# Patient Record
Sex: FEMALE | Race: WHITE | NOT HISPANIC OR LATINO | Employment: FULL TIME | URBAN - METROPOLITAN AREA
[De-identification: names, ages, dates, MRNs, and addresses within clinical notes are randomized per-mention and may not be internally consistent; named-entity substitution may affect disease eponyms.]

---

## 2017-01-10 ENCOUNTER — ALLSCRIPTS OFFICE VISIT (OUTPATIENT)
Dept: OTHER | Facility: OTHER | Age: 52
End: 2017-01-10

## 2017-05-23 ENCOUNTER — ALLSCRIPTS OFFICE VISIT (OUTPATIENT)
Dept: OTHER | Facility: OTHER | Age: 52
End: 2017-05-23

## 2017-06-29 RX ORDER — ACETAMINOPHEN, ASPIRIN AND CAFFEINE 250; 250; 65 MG/1; MG/1; MG/1
1 TABLET, FILM COATED ORAL EVERY 6 HOURS PRN
COMMUNITY

## 2017-06-29 RX ORDER — LORAZEPAM 1 MG/1
1 TABLET ORAL EVERY 8 HOURS PRN
COMMUNITY

## 2017-06-29 RX ORDER — ESCITALOPRAM OXALATE 5 MG/1
5 TABLET ORAL EVERY MORNING
COMMUNITY

## 2017-06-29 RX ORDER — LEVOTHYROXINE SODIUM 0.07 MG/1
75 TABLET ORAL EVERY MORNING
COMMUNITY

## 2017-06-29 RX ORDER — LISINOPRIL 10 MG/1
10 TABLET ORAL
COMMUNITY

## 2017-06-29 RX ORDER — BUPROPION HYDROCHLORIDE 150 MG/1
150 TABLET ORAL
COMMUNITY

## 2017-06-29 RX ORDER — DIPHENOXYLATE HYDROCHLORIDE AND ATROPINE SULFATE 2.5; .025 MG/1; MG/1
1 TABLET ORAL DAILY
COMMUNITY

## 2017-07-05 ENCOUNTER — ANESTHESIA EVENT (OUTPATIENT)
Dept: PERIOP | Facility: AMBULARY SURGERY CENTER | Age: 52
End: 2017-07-05
Payer: COMMERCIAL

## 2017-07-06 ENCOUNTER — HOSPITAL ENCOUNTER (OUTPATIENT)
Facility: AMBULARY SURGERY CENTER | Age: 52
Setting detail: OUTPATIENT SURGERY
Discharge: HOME | End: 2017-07-06
Attending: ORTHOPAEDIC SURGERY | Admitting: ORTHOPAEDIC SURGERY
Payer: COMMERCIAL

## 2017-07-06 ENCOUNTER — ANESTHESIA (OUTPATIENT)
Dept: PERIOP | Facility: AMBULARY SURGERY CENTER | Age: 52
End: 2017-07-06
Payer: COMMERCIAL

## 2017-07-06 VITALS
SYSTOLIC BLOOD PRESSURE: 103 MMHG | OXYGEN SATURATION: 94 % | WEIGHT: 194 LBS | HEIGHT: 67 IN | HEART RATE: 80 BPM | BODY MASS INDEX: 30.45 KG/M2 | DIASTOLIC BLOOD PRESSURE: 59 MMHG | RESPIRATION RATE: 16 BRPM | TEMPERATURE: 97.5 F

## 2017-07-06 LAB — EXT PREGNANCY TEST URINE: NORMAL

## 2017-07-06 PROCEDURE — 81025 URINE PREGNANCY TEST: CPT | Performed by: ORTHOPAEDIC SURGERY

## 2017-07-06 RX ORDER — MIDAZOLAM HYDROCHLORIDE 1 MG/ML
INJECTION INTRAMUSCULAR; INTRAVENOUS AS NEEDED
Status: DISCONTINUED | OUTPATIENT
Start: 2017-07-06 | End: 2017-07-06 | Stop reason: SURG

## 2017-07-06 RX ORDER — SODIUM CHLORIDE, SODIUM LACTATE, POTASSIUM CHLORIDE, CALCIUM CHLORIDE 600; 310; 30; 20 MG/100ML; MG/100ML; MG/100ML; MG/100ML
100 INJECTION, SOLUTION INTRAVENOUS CONTINUOUS
Status: DISCONTINUED | OUTPATIENT
Start: 2017-07-06 | End: 2017-07-06 | Stop reason: HOSPADM

## 2017-07-06 RX ORDER — FENTANYL CITRATE 50 UG/ML
INJECTION, SOLUTION INTRAMUSCULAR; INTRAVENOUS AS NEEDED
Status: DISCONTINUED | OUTPATIENT
Start: 2017-07-06 | End: 2017-07-06 | Stop reason: SURG

## 2017-07-06 RX ORDER — PROPOFOL 10 MG/ML
INJECTION, EMULSION INTRAVENOUS AS NEEDED
Status: DISCONTINUED | OUTPATIENT
Start: 2017-07-06 | End: 2017-07-06 | Stop reason: SURG

## 2017-07-06 RX ADMIN — SODIUM CHLORIDE, SODIUM LACTATE, POTASSIUM CHLORIDE, AND CALCIUM CHLORIDE 100 ML/HR: .6; .31; .03; .02 INJECTION, SOLUTION INTRAVENOUS at 06:41

## 2017-07-06 RX ADMIN — FENTANYL CITRATE 50 MCG: 50 INJECTION, SOLUTION INTRAMUSCULAR; INTRAVENOUS at 07:21

## 2017-07-06 RX ADMIN — FENTANYL CITRATE 50 MCG: 50 INJECTION, SOLUTION INTRAMUSCULAR; INTRAVENOUS at 07:28

## 2017-07-06 RX ADMIN — PROPOFOL 150 MG: 10 INJECTION, EMULSION INTRAVENOUS at 07:22

## 2017-07-06 RX ADMIN — MIDAZOLAM HYDROCHLORIDE 2 MG: 1 INJECTION, SOLUTION INTRAMUSCULAR; INTRAVENOUS at 07:19

## 2017-07-06 RX ADMIN — CEFAZOLIN SODIUM 2000 MG: 2 SOLUTION INTRAVENOUS at 07:23

## 2017-07-06 RX ADMIN — PROPOFOL 50 MG: 10 INJECTION, EMULSION INTRAVENOUS at 07:27

## 2017-07-18 ENCOUNTER — ALLSCRIPTS OFFICE VISIT (OUTPATIENT)
Dept: OTHER | Facility: OTHER | Age: 52
End: 2017-07-18

## 2021-01-19 ENCOUNTER — OFFICE VISIT (OUTPATIENT)
Dept: OBGYN CLINIC | Facility: CLINIC | Age: 56
End: 2021-01-19
Payer: COMMERCIAL

## 2021-01-19 ENCOUNTER — TELEPHONE (OUTPATIENT)
Dept: OBGYN CLINIC | Facility: CLINIC | Age: 56
End: 2021-01-19

## 2021-01-19 VITALS
HEIGHT: 67 IN | WEIGHT: 207.2 LBS | HEART RATE: 88 BPM | DIASTOLIC BLOOD PRESSURE: 78 MMHG | SYSTOLIC BLOOD PRESSURE: 130 MMHG | BODY MASS INDEX: 32.52 KG/M2

## 2021-01-19 DIAGNOSIS — M77.8 FOREARM TENDONITIS: Primary | ICD-10-CM

## 2021-01-19 PROCEDURE — 99243 OFF/OP CNSLTJ NEW/EST LOW 30: CPT | Performed by: ORTHOPAEDIC SURGERY

## 2021-01-19 NOTE — PROGRESS NOTES
Assessment/Plan:  1  Forearm tendonitis, left         Scribe Attestation    I,:  Yaz Simpson am acting as a scribe while in the presence of the attending physician :       I,:  Humza Ross MD personally performed the services described in this documentation    as scribed in my presence :         After obtaining a thorough history, clinical examination and review of imaging, I do believe Vik Sam is demonstrating signs and symptoms consistent with ECRL and ECRB tendinitis  She does demonstrate tenderness along the muscle belly of the common extensors  Additionally, she does have a negative radial tunnel sign on exam   I do believe we can begin treating her non operatively in the form of a cock-up wrist brace  She may obtain this brace at her local drug store  If she is unable to purchase this brace, she may follow up in the office and 1 of my medical assistants may fit her for this brace  Additionally, I did provide her with instructions on obtaining a flex bar and associated exercises  I do believe she will see improvement in her symptoms with the brace and these exercises  If she does not see progression in 4 weeks she may follow up, otherwise she may follow up on an as-needed basis  Subjective:   Jie Pollard is a 54 y o  female who presents to the office today for initial evaluation of left forearm pain  She states she began experiencing pain roughly 1 month ago with no inciting event  She states activities such as gripping or pulling her pants up exacerbates her symptoms  She localizes majority of her discomfort along for the lateral aspect of her forearm  She describes her pain as activity related, achy, sore, occasionally sharp and moderate in intensity  She denies any conservative treatment in the form of pain medication, stretches or bracing  She does appreciate mild numbness into her hand when she is laying on her left arm    However, she denies any distal paresthesia today       Review of Systems   Constitutional: Positive for activity change  Negative for chills, fever and unexpected weight change  HENT: Negative for hearing loss, nosebleeds and sore throat  Eyes: Negative for pain, redness and visual disturbance  Respiratory: Negative for cough, shortness of breath and wheezing  Cardiovascular: Negative for chest pain, palpitations and leg swelling  Gastrointestinal: Negative for abdominal pain, nausea and vomiting  Endocrine: Negative for polydipsia and polyuria  Genitourinary: Negative for dysuria and hematuria  Musculoskeletal:        See HPI   Skin: Negative for rash and wound  Neurological: Negative for dizziness, numbness and headaches  Psychiatric/Behavioral: Negative for decreased concentration and suicidal ideas  The patient is not nervous/anxious            Past Medical History:   Diagnosis Date    Anxiety     Depression     Disease of thyroid gland     hypo    Hypertension     Panic disorder     Wears glasses     for driving and close work       Past Surgical History:   Procedure Laterality Date    CERVIX SURGERY      removal of HPV    COLONOSCOPY      ESOPHAGOGASTRODUODENOSCOPY      [facility] REVISE MEDIAN N/CARPAL TUNNEL SURG Right 7/6/2017    Procedure: RELEASE CARPAL TUNNEL;  Surgeon: Darling Marrero MD;  [facility] MAIN OR;  Service: Orthopedics    WISDOM TOOTH EXTRACTION      x4       Family History   Problem Relation Age of Onset    Osteoarthritis Mother     Hypertension Father     Diabetes Brother     Arthritis Brother         rheumatoid       Social History     Occupational History    Not on file   Tobacco Use    Smoking status: Never Smoker    Smokeless tobacco: Never Used   Substance and Sexual Activity    Alcohol use: Not Currently    Drug use: No     Comment: stopped occ marijuana 11/16    Sexual activity: Not Currently         Current Outpatient Medications:     aspirin-acetaminophen-caffeine Ayaz Madison MIGRAINE) 250-250-65 MG per tablet, Take 1 tablet by mouth every 6 (six) hours as needed for headaches, Disp: , Rfl:     buPROPion (WELLBUTRIN XL) 150 mg 24 hr tablet, Take 150 mg by mouth daily at bedtime, Disp: , Rfl:     escitalopram (LEXAPRO) 5 mg tablet, Take 5 mg by mouth every morning, Disp: , Rfl:     levothyroxine 75 mcg tablet, Take 75 mcg by mouth every morning, Disp: , Rfl:     lisinopril (ZESTRIL) 10 mg tablet, Take 10 mg by mouth daily at bedtime, Disp: , Rfl:     LORazepam (ATIVAN) 1 mg tablet, Take 1 mg by mouth every 8 (eight) hours as needed for anxiety, Disp: , Rfl:     multivitamin (THERAGRAN) TABS, Take 1 tablet by mouth daily, Disp: , Rfl:     Allergies   Allergen Reactions    Zithromax [Azithromycin] Hives    Bacitracin Rash    Latex Rash     Diaphragm,bandages       Objective:  Vitals:    01/19/21 1304   BP: 130/78   Pulse: 88       Left Elbow Exam     Tenderness   Left elbow tenderness location: Along the muscle belly of the common extensors  Range of Motion   Extension: 0   Flexion: 120     Muscle Strength   Pronation:  5/5   Supination:  5/5     Tests   Tinel's sign (cubital tunnel): negative    Other   Erythema: absent  Sensation: normal  Pulse: present    Comments:    Radial tunnel test (-)            Physical Exam  Vitals signs reviewed  Constitutional:       Appearance: Normal appearance  She is well-developed  HENT:      Head: Normocephalic and atraumatic  Eyes:      General:         Right eye: No discharge  Left eye: No discharge  Conjunctiva/sclera: Conjunctivae normal       Pupils: Pupils are equal, round, and reactive to light  Neck:      Musculoskeletal: Normal range of motion and neck supple  Cardiovascular:      Rate and Rhythm: Normal rate  Pulmonary:      Effort: Pulmonary effort is normal  No respiratory distress  Musculoskeletal:      Comments: As noted in HPI  Skin:     General: Skin is warm and dry     Neurological:      General: No focal deficit present  Mental Status: She is alert and oriented to person, place, and time     Psychiatric:         Mood and Affect: Mood normal          Behavior: Behavior normal

## 2021-01-19 NOTE — TELEPHONE ENCOUNTER
Patient may come in to the [facility] office on Monday if she is unable to find a cock-up wrist brace at her local drug store  She will call Monday morning if she will be coming in  Appointment is not needed

## 2021-01-25 DIAGNOSIS — M77.8 FOREARM TENDONITIS: Primary | ICD-10-CM

## 2022-06-24 ENCOUNTER — TELEPHONE (OUTPATIENT)
Dept: OBGYN CLINIC | Facility: HOSPITAL | Age: 57
End: 2022-06-24

## 2022-07-19 ENCOUNTER — APPOINTMENT (OUTPATIENT)
Dept: RADIOLOGY | Facility: CLINIC | Age: 57
End: 2022-07-19
Payer: COMMERCIAL

## 2022-07-19 ENCOUNTER — OFFICE VISIT (OUTPATIENT)
Dept: OBGYN CLINIC | Facility: CLINIC | Age: 57
End: 2022-07-19
Payer: COMMERCIAL

## 2022-07-19 DIAGNOSIS — S93.409A SPRAIN OF ANKLE, INITIAL ENCOUNTER: ICD-10-CM

## 2022-07-19 DIAGNOSIS — M25.571 PAIN, JOINT, ANKLE AND FOOT, RIGHT: ICD-10-CM

## 2022-07-19 DIAGNOSIS — M76.71 PERONEAL TENDONITIS, RIGHT: ICD-10-CM

## 2022-07-19 DIAGNOSIS — M25.571 PAIN, JOINT, ANKLE AND FOOT, RIGHT: Primary | ICD-10-CM

## 2022-07-19 PROCEDURE — 99214 OFFICE O/P EST MOD 30 MIN: CPT | Performed by: ORTHOPAEDIC SURGERY

## 2022-07-19 PROCEDURE — 73610 X-RAY EXAM OF ANKLE: CPT

## 2022-07-19 NOTE — PROGRESS NOTES
Assessment/Plan:  1  Pain, joint, ankle and foot, right  XR ankle 3+ vw right   2  Peroneal tendonitis, right  Ambulatory Referral to Physical Therapy   3  Sprain of ankle, initial encounter  Ambulatory Referral to Physical Therapy     The patient does appear to have peroneal tendonitis and possibly a mild ankle sprain  Her numbness is likely due to some swelling around the peroneal nerve  I do feel she would benefit from physical therapy for this, and I did prescribe this today  She may continue to ambulate as tolerated  I do not suspect a tendon or ligament tear at this time, however if the patient fails to make progress I will likely order an MRI to further evaluate  She will FU in 6 weeks for repeat evaluation  Subjective:   Brent Kerr is a 62 y o  female who presents today for evaluation of her right ankle  She notes she started with lateral ankle pain back in May after getting into and out of a car repeatedly  She denies any specific injury prior to the onset of her symptoms  She did see podiatry for this and did bracing for 4 weeks, then had a steroid injection, then was placed in a boot most recently, however she stopped wearing this  She notes pain about the lateral ankle, which is worse with activity and walking on uneven ground  Rest helps  She notes intermittent swelling  She complains of some paresthesias about the dorsum of the foot  Review of Systems   Constitutional: Negative  Negative for chills and fever  HENT: Negative  Negative for ear pain and sore throat  Eyes: Negative  Negative for pain and redness  Respiratory: Negative  Negative for shortness of breath and wheezing  Cardiovascular: Negative for chest pain and palpitations  Gastrointestinal: Negative  Negative for abdominal pain and blood in stool  Endocrine: Negative  Negative for polydipsia and polyuria  Genitourinary: Negative  Negative for difficulty urinating and dysuria     Musculoskeletal:        As noted in HPI   Skin: Negative  Negative for pallor and rash  Neurological: Positive for numbness  Negative for dizziness  Hematological: Negative  Negative for adenopathy  Does not bruise/bleed easily  Psychiatric/Behavioral: Negative  Negative for confusion and suicidal ideas           Past Medical History:   Diagnosis Date    Anxiety     Depression     Disease of thyroid gland     hypo    Hypertension     Panic disorder     Wears glasses     for driving and close work       Past Surgical History:   Procedure Laterality Date    CERVIX SURGERY      removal of HPV    COLONOSCOPY      ESOPHAGOGASTRODUODENOSCOPY      [facility] RE[facility] MEDIAN N/CARPAL TUNNEL SURG Right 7/6/2017    Procedure: RELEASE CARPAL TUNNEL;  Surgeon: Katty Lanza MD;  Location: [place] [address] MAIN OR;  Service: Orthopedics    WISDOM TOOTH EXTRACTION      x4       Family History   Problem Relation Age of Onset    Osteoarthritis Mother     Hypertension Father     Diabetes Brother     Arthritis Brother         rheumatoid       Social History     Occupational History    Not on file   Tobacco Use    Smoking status: Never Smoker    Smokeless tobacco: Never Used   Vaping Use    Vaping Use: Never used   Substance and Sexual Activity    Alcohol use: Not Currently    Drug use: No     Comment: stopped occ marijuana 11/16    Sexual activity: Not Currently         Current Outpatient Medications:     aspirin-acetaminophen-caffeine (EXCEDRIN MIGRAINE) 250-250-65 MG per tablet, Take 1 tablet by mouth every 6 (six) hours as needed for headaches, Disp: , Rfl:     buPROPion (WELLBUTRIN XL) 150 mg 24 hr tablet, Take 150 mg by mouth daily at bedtime, Disp: , Rfl:     escitalopram (LEXAPRO) 5 mg tablet, Take 5 mg by mouth every morning, Disp: , Rfl:     levothyroxine 75 mcg tablet, Take 75 mcg by mouth every morning, Disp: , Rfl:     lisinopril (ZESTRIL) 10 mg tablet, Take 10 mg by mouth daily at bedtime, Disp: , Rfl:     LORazepam (ATIVAN) 1 mg tablet, Take 1 mg by mouth every 8 (eight) hours as needed for anxiety, Disp: , Rfl:     multivitamin (THERAGRAN) TABS, Take 1 tablet by mouth daily, Disp: , Rfl:     Allergies   Allergen Reactions    Zithromax [Azithromycin] Hives    Bacitracin Rash    Latex Rash     Diaphragm,bandages       Objective: There were no vitals filed for this visit  Right Ankle Exam     Tenderness   The patient is experiencing tenderness in the ATF (peroneal tendons)  Swelling: mild (overlying peroneal tendons)    Range of Motion   Dorsiflexion: normal   Plantar flexion: normal   Eversion: normal   Inversion: normal     Muscle Strength   Dorsiflexion:  5/5  Plantar flexion:  5/5  Posterior tibial:  5/5  Peroneal muscle:  5/5    Other   Erythema: absent  Pulse: present     Comments:  Paresthesias superficial peroneal nerve distribution  Strength/Myotome Testing     Right Ankle/Foot   Dorsiflexion: 5  Plantar flexion: 5      Physical Exam  Vitals and nursing note reviewed  Constitutional:       Appearance: She is well-developed  HENT:      Head: Normocephalic and atraumatic  Cardiovascular:      Rate and Rhythm: Regular rhythm  Pulmonary:      Effort: Pulmonary effort is normal  No respiratory distress  Musculoskeletal:      Cervical back: Normal range of motion and neck supple  Skin:     Findings: No rash  Neurological:      Mental Status: She is alert and oriented to person, place, and time  Psychiatric:         Behavior: Behavior normal          I have personally reviewed pertinent films in PACS and my interpretation is as follows:  Xrays right ankle: No acute osseous abnormality

## 2022-08-30 ENCOUNTER — OFFICE VISIT (OUTPATIENT)
Dept: OBGYN CLINIC | Facility: CLINIC | Age: 57
End: 2022-08-30
Payer: COMMERCIAL

## 2022-08-30 VITALS
BODY MASS INDEX: 32.96 KG/M2 | HEIGHT: 67 IN | HEART RATE: 89 BPM | WEIGHT: 210 LBS | SYSTOLIC BLOOD PRESSURE: 117 MMHG | DIASTOLIC BLOOD PRESSURE: 70 MMHG

## 2022-08-30 DIAGNOSIS — S93.401D SPRAIN OF RIGHT ANKLE, UNSPECIFIED LIGAMENT, SUBSEQUENT ENCOUNTER: Primary | ICD-10-CM

## 2022-08-30 DIAGNOSIS — M54.17 LUMBOSACRAL RADICULOPATHY AT L5: ICD-10-CM

## 2022-08-30 PROCEDURE — 99214 OFFICE O/P EST MOD 30 MIN: CPT | Performed by: ORTHOPAEDIC SURGERY

## 2022-08-30 NOTE — PROGRESS NOTES
Assessment/Plan:  1  Sprain of right ankle, unspecified ligament, subsequent encounter  MRI ankle/heel right  wo contrast   2  Lumbosacral radiculopathy at L5         Scribe Attestation    I,:  Nate Hansen am acting as a scribe while in the presence of the attending physician :       I,:  Tiffany Craft MD personally performed the services described in this documentation    as scribed in my presence :       '    Shanelle Kerr upon examination and review of the EMG study of her lower extremities does appear to have to ongoing issues of a lateral ankle sprain as well as L5 radiculopathy  The EMG does indicate left L4 and bilateral L5 radiculopathy  The findings at L5 are consistent with her complaints with paresthesias along the anterior aspect of the ankle and dorsal aspect of the foot  Despite the findings of the EMG study, I do believe that she is also experiencing issues concerning the anterior talofibular ligament  Her ankle is stable  However, does demonstrate significant point tenderness at the anterior talofibular ligament on exam   She does also demonstrate the weakness into the ankle affecting eversion was greatly  Despite non operative treatment in the form of physical therapy I would like to order an MRI of the right ankle to question a anterior talofibular ligament tear  Shanelleusha Medeiroskalin verbalized understanding was amenable and on MRI  My office will help facilitate having the MRI scheduled  She will follow up with me once the MRI is completed  Subjective:   Veto Primrose is a 62 y o  female who presents evaluation of her right ankle  This has been ongoing issue since May after getting out of her car times  She states that she does still experience swelling laterally the ankle as well as pain at the anterolateral aspect of the ankle with prolonged weight-bearing activities  She does also have complaints of anterior ankle and dorsal foot paresthesias    She is seeing another provider for her back and did have a nerve study completed that does demonstrate involvement of left L4 and bilateral L5 radiculopathy  He has participated in physical therapy and does not appreciate significant improvement with her overall ankle function  He does also complains of persistent weakness particularly with eversion of the ankle  She does report to his visit with complaints of paresthesias at the anterior aspect of the ankle and dorsal aspect of the foot  Review of Systems   Constitutional: Negative for chills, fever and unexpected weight change  HENT: Negative for hearing loss, nosebleeds and sore throat  Eyes: Negative for pain, redness and visual disturbance  Respiratory: Negative for cough, shortness of breath and wheezing  Cardiovascular: Negative for chest pain, palpitations and leg swelling  Gastrointestinal: Negative for abdominal pain, nausea and vomiting  Endocrine: Negative for polydipsia and polyuria  Genitourinary: Negative for dysuria and hematuria  Musculoskeletal: Positive for arthralgias (Right ankle), joint swelling (Right ankle) and myalgias (Right ankle)  See HPI   Skin: Negative for rash and wound  Neurological: Positive for numbness (L5 distribution right lower extremity)  Negative for dizziness and headaches  Psychiatric/Behavioral: Negative for decreased concentration and suicidal ideas  The patient is not nervous/anxious            Past Medical History:   Diagnosis Date    Anxiety     Depression     Disease of thyroid gland     hypo    Hypertension     Panic disorder     Wears glasses     for driving and close work       Past Surgical History:   Procedure Laterality Date    CERVIX SURGERY      removal of HPV    COLONOSCOPY      ESOPHAGOGASTRODUODENOSCOPY      [facility] REVISE MEDIAN N/CARPAL TUNNEL SURG Right 7/6/2017    Procedure: RELEASE CARPAL TUNNEL;  Surgeon: Kristie Moreno MD;  Location: [place] [address] MAIN OR;  Service: Orthopedics    WISDOM TOOTH EXTRACTION x4       Family History   Problem Relation Age of Onset    Osteoarthritis Mother     Hypertension Father     Diabetes Brother     Arthritis Brother         rheumatoid       Social History     Occupational History    Not on file   Tobacco Use    Smoking status: Never Smoker    Smokeless tobacco: Never Used   Vaping Use    Vaping Use: Never used   Substance and Sexual Activity    Alcohol use: Not Currently    Drug use: No     Comment: stopped occ marijuana 11/16    Sexual activity: Not Currently         Current Outpatient Medications:     aspirin-acetaminophen-caffeine (EXCEDRIN MIGRAINE) 250-250-65 MG per tablet, Take 1 tablet by mouth every 6 (six) hours as needed for headaches, Disp: , Rfl:     buPROPion (WELLBUTRIN XL) 150 mg 24 hr tablet, Take 150 mg by mouth daily at bedtime, Disp: , Rfl:     escitalopram (LEXAPRO) 5 mg tablet, Take 5 mg by mouth every morning, Disp: , Rfl:     levothyroxine 75 mcg tablet, Take 75 mcg by mouth every morning, Disp: , Rfl:     lisinopril (ZESTRIL) 10 mg tablet, Take 10 mg by mouth daily at bedtime, Disp: , Rfl:     LORazepam (ATIVAN) 1 mg tablet, Take 1 mg by mouth every 8 (eight) hours as needed for anxiety, Disp: , Rfl:     multivitamin (THERAGRAN) TABS, Take 1 tablet by mouth daily, Disp: , Rfl:     Allergies   Allergen Reactions    Zithromax [Azithromycin] Hives    Bacitracin Rash    Latex Rash     Diaphragm,bandages       Objective:  Vitals:    08/30/22 1011   BP: 117/70   Pulse: 89       Right Ankle Exam     Tenderness   The patient is experiencing tenderness in the ATF  Swelling: mild (lateral)    Range of Motion   Dorsiflexion: 10   Plantar flexion: 30   Eversion: 10   Inversion: 10     Muscle Strength   Dorsiflexion:  5/5  Plantar flexion:  5/5  Posterior tibial:  5/5  Peroneal muscle:  4/5    Tests   Anterior drawer: negative  Varus tilt: negative    Other   Erythema: absent  Scars: absent  Right ankle sensation: Does appreciate sensation of touch  However does have complains of paresthesias along the L5 distribution  Pulse: present     Comments:  No tenderness of the peroneals          Observations     Right Ankle/Foot   Negative for adhesive scar  Strength/Myotome Testing     Right Ankle/Foot   Dorsiflexion: 5  Plantar flexion: 5      Physical Exam  Vitals reviewed  HENT:      Head: Normocephalic and atraumatic  Eyes:      General:         Right eye: No discharge  Left eye: No discharge  Conjunctiva/sclera: Conjunctivae normal       Pupils: Pupils are equal, round, and reactive to light  Cardiovascular:      Rate and Rhythm: Normal rate  Pulmonary:      Effort: Pulmonary effort is normal  No respiratory distress  Musculoskeletal:      Cervical back: Normal range of motion and neck supple  Comments: As noted in HPI   Skin:     General: Skin is warm and dry  Neurological:      Mental Status: She is alert and oriented to person, place, and time  I have personally reviewed pertinent films in PACS and my interpretation is as follows: No images reviewed today      This document was created using speech voice recognition software  Grammatical errors, random word insertions, pronoun errors, and incomplete sentences are an occasional consequence of this system due to software limitations, ambient noise, and hardware issues  Any formal questions or concerns about content, text, or information contained within the body of this dictation should be directly addressed to the provider for clarification

## 2022-08-31 ENCOUNTER — TELEPHONE (OUTPATIENT)
Dept: OBGYN CLINIC | Facility: HOSPITAL | Age: 57
End: 2022-08-31

## 2022-08-31 NOTE — TELEPHONE ENCOUNTER
Patient is calling to schedule her MRI review appt with dr Ashok Trujillo (mri scheduled for 9/12) stated she had to callback after she checked her schedule

## 2022-09-08 ENCOUNTER — TELEPHONE (OUTPATIENT)
Dept: OBGYN CLINIC | Facility: HOSPITAL | Age: 57
End: 2022-09-08

## 2022-09-08 NOTE — TELEPHONE ENCOUNTER
Patient called, she will dropping off her MRI disc on Monday 9/12  She has asked if you can take a look at it and give her a call  She is having a hard time coming into the office due to her work schedule  CB# [phone] ok to leave a detailed message per patient

## 2022-09-09 NOTE — TELEPHONE ENCOUNTER
According to report, there are several abnormal findings on MRI  SHe will need to come back in the office to review this with Dr Delarosa Brought

## 2022-09-14 ENCOUNTER — OFFICE VISIT (OUTPATIENT)
Dept: OBGYN CLINIC | Facility: CLINIC | Age: 57
End: 2022-09-14
Payer: COMMERCIAL

## 2022-09-14 VITALS
SYSTOLIC BLOOD PRESSURE: 129 MMHG | TEMPERATURE: 98.2 F | RESPIRATION RATE: 19 BRPM | BODY MASS INDEX: 32.89 KG/M2 | DIASTOLIC BLOOD PRESSURE: 80 MMHG | HEART RATE: 82 BPM | HEIGHT: 67 IN

## 2022-09-14 DIAGNOSIS — S86.311A PERONEAL TENDON TEAR, RIGHT, INITIAL ENCOUNTER: Primary | ICD-10-CM

## 2022-09-14 PROCEDURE — 99214 OFFICE O/P EST MOD 30 MIN: CPT | Performed by: ORTHOPAEDIC SURGERY

## 2022-09-14 NOTE — PROGRESS NOTES
Assessment/Plan:  1  Peroneal tendon tear, right, initial encounter       Scribe Attestation    I,:  Kizzy Moore am acting as a scribe while in the presence of the attending physician :       I,:  Isidro Womack MD personally performed the services described in this documentation    as scribed in my presence :         [place] upon examination and review the MRI report the right ankle does demonstrate longitudinal split tear of the peroneal brevis tendon  This is consistent with her clinical exam with weakness into eversion and point tenderness along the peroneal brevis tendon  I did remark that given that she has failed non operative care in the form of physical therapy and rest to could consider surgical intervention the form of a right ankle peroneal brevis debridement versus repair  I did discuss the procedure with her at length as well as the risks including but not limited to bleeding, infection, nerve injury, blood clot, worsening of symptoms, not achieving the anticipated results, persistent stiffness, weakness and the need for additional surgery  I did also discuss postoperative expectations with nonweightbearing status of approximately 6 weeks in a splint and progression to a boot and physical therapy  We did discuss modifications to occupation use a kneeling scooter so that she would be able to partake in her job duties  However, she does believe this be quite difficult and she does work long shifts  Vivek verbalized understanding and had no further questions  She would like to consider surgical intervention after the holidays in January 2023  I did advise her to follow up with me in approximately 4 weeks prior to her desired surgical date for repeat clinical evaluation and surgical consultation and consent  Otherwise, I will see her back on an as-needed basis      Subjective:   Eli Resendez is a 62 y o  female who presents for follow-up evaluation of her right ankle    This has been ongoing issue since May 2022 after getting in and out of her car multiple times  She localizes pain to the lateral aspect of her ankle as well as associated swelling  Her pain is exacerbated with weight-bearing activities  Pain is better while at rest   She does continue to have complaints of paresthesias along the dorsal aspect of her foot  She does have a diagnosis of L4 and bilateral L5 radiculopathy confirmed by EMG study  Unfortunately physical therapy has not provided her with significant symptomatic relief  She does have persistent weakness into the ankle particularly with eversion  She notes this is problematic as 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[employer] insists she does have difficulty and significant pain after long day of work  She did have an MRI of the right ankle completed to be reviewed today  Review of Systems   Constitutional: Negative for chills, fever and unexpected weight change  HENT: Negative for hearing loss, nosebleeds and sore throat  Eyes: Negative for pain, redness and visual disturbance  Respiratory: Negative for cough, shortness of breath and wheezing  Cardiovascular: Negative for chest pain, palpitations and leg swelling  Gastrointestinal: Negative for abdominal pain, nausea and vomiting  Endocrine: Negative for polydipsia and polyuria  Genitourinary: Negative for dysuria and hematuria  Musculoskeletal: Positive for arthralgias and myalgias  See HPI   Skin: Negative for rash and wound  Neurological: Negative for dizziness, numbness and headaches  Psychiatric/Behavioral: Negative for decreased concentration and suicidal ideas  The patient is not nervous/anxious            Past Medical History:   Diagnosis Date    Anxiety     Depression     Disease of thyroid gland     hypo    Hypertension     Panic disorder     Wears glasses     for driving and close work       Past Surgical History:   Procedure Laterality Date    CERVIX SURGERY      removal of HPV  COLONOSCOPY      ESOPHAGOGASTRODUODENOSCOPY      ID REVISE MEDIAN N/CARPAL TUNNEL SURG Right 7/6/2017    Procedure: RELEASE CARPAL TUNNEL;  Surgeon: Edmar Coffman MD;  Location: [facility] MAIN OR;  Service: Orthopedics    WISDOM TOOTH EXTRACTION      x4       Family History   Problem Relation Age of Onset    Osteoarthritis Mother     Hypertension Father     Diabetes Brother     Arthritis Brother         rheumatoid       Social History     Occupational History    Not on file   Tobacco Use    Smoking status: Never Smoker    Smokeless tobacco: Never Used   Vaping Use    Vaping Use: Never used   Substance and Sexual Activity    Alcohol use: Not Currently    Drug use: No     Comment: stopped occ marijuana 11/16    Sexual activity: Not Currently         Current Outpatient Medications:     aspirin-acetaminophen-caffeine (EXCEDRIN MIGRAINE) 250-250-65 MG per tablet, Take 1 tablet by mouth every 6 (six) hours as needed for headaches, Disp: , Rfl:     buPROPion (WELLBUTRIN XL) 150 mg 24 hr tablet, Take 150 mg by mouth daily at bedtime, Disp: , Rfl:     escitalopram (LEXAPRO) 5 mg tablet, Take 5 mg by mouth every morning, Disp: , Rfl:     levothyroxine 75 mcg tablet, Take 75 mcg by mouth every morning, Disp: , Rfl:     lisinopril (ZESTRIL) 10 mg tablet, Take 10 mg by mouth daily at bedtime, Disp: , Rfl:     LORazepam (ATIVAN) 1 mg tablet, Take 1 mg by mouth every 8 (eight) hours as needed for anxiety, Disp: , Rfl:     multivitamin (THERAGRAN) TABS, Take 1 tablet by mouth daily, Disp: , Rfl:     Allergies   Allergen Reactions    Zithromax [Azithromycin] Hives    Bacitracin Rash    Latex Rash     Diaphragm,bandages       Objective:  Vitals:    09/14/22 1155   BP: 129/80   Pulse: 82   Resp: 19   Temp: 98 2 °F (36 8 °C)       Right Ankle Exam     Tenderness   Right ankle tenderness location: peroneal brevis tendon    Swelling: mild    Range of Motion   Dorsiflexion: 10   Plantar flexion: 30   Eversion: 10   Inversion: 10     Muscle Strength   Right ankle normal muscle strength: pain with eversion  Dorsiflexion:  5/5  Plantar flexion:  5/5  Peroneal muscle:  4/5    Tests   Anterior drawer: negative  Varus tilt: negative    Other   Erythema: absent  Scars: absent  Sensation: decreased (mild decreased L5)  Pulse: present           Observations     Right Ankle/Foot   Negative for adhesive scar  Strength/Myotome Testing     Right Ankle/Foot   Right ankle normal muscle strength: pain with eversion  Dorsiflexion: 5  Plantar flexion: 5      Physical Exam  Vitals reviewed  HENT:      Head: Normocephalic and atraumatic  Eyes:      General:         Right eye: No discharge  Left eye: No discharge  Conjunctiva/sclera: Conjunctivae normal       Pupils: Pupils are equal, round, and reactive to light  Cardiovascular:      Rate and Rhythm: Normal rate  Pulmonary:      Effort: Pulmonary effort is normal  No respiratory distress  Musculoskeletal:      Cervical back: Normal range of motion and neck supple  Comments: As noted in HPI   Skin:     General: Skin is warm and dry  Neurological:      Mental Status: She is alert and oriented to person, place, and time  I have personally reviewed pertinent films in PACS and my interpretation is as follows:    MRI report of the right ankle demonstrates a longitudinal split tear of the peroneal brevis  This document was created using speech voice recognition software  Grammatical errors, random word insertions, pronoun errors, and incomplete sentences are an occasional consequence of this system due to software limitations, ambient noise, and hardware issues  Any formal questions or concerns about content, text, or information contained within the body of this dictation should be directly addressed to the provider for clarification

## 2022-09-19 ENCOUNTER — TELEPHONE (OUTPATIENT)
Dept: OBGYN CLINIC | Facility: CLINIC | Age: 57
End: 2022-09-19

## 2022-09-19 DIAGNOSIS — S86.311A PERONEAL TENDON TEAR, RIGHT, INITIAL ENCOUNTER: ICD-10-CM

## 2022-09-19 DIAGNOSIS — M76.71 PERONEAL TENDONITIS, RIGHT: Primary | ICD-10-CM

## 2022-09-19 NOTE — TELEPHONE ENCOUNTER
Patient stopped into [facility] office for a lace up ankle brace      I fitted the patient with the brace and she was pleased

## 2022-09-19 NOTE — TELEPHONE ENCOUNTER
Patient wanted to see if there is an ankle brace Dr Pete Lipscomb would recommend for her to try  She can go to  [facility] today to pick it up

## 2025-01-27 ENCOUNTER — EMERGENCY VISIT (OUTPATIENT)
Dept: URBAN - METROPOLITAN AREA CLINIC 63 | Facility: CLINIC | Age: 60
End: 2025-01-27

## 2025-01-27 DIAGNOSIS — H02.886: ICD-10-CM

## 2025-01-27 DIAGNOSIS — H04.123: ICD-10-CM

## 2025-01-27 DIAGNOSIS — H02.883: ICD-10-CM

## 2025-01-27 PROCEDURE — 92012 INTRM OPH EXAM EST PATIENT: CPT

## 2025-01-27 ASSESSMENT — VISUAL ACUITY
OS_CC: 20/20-1
OD_CC: 20/20-1

## 2025-01-27 ASSESSMENT — TONOMETRY
OS_IOP_MMHG: 10
OD_IOP_MMHG: 10

## (undated) DEVICE — NEEDLE 25G X 1 1/2

## (undated) DEVICE — STRETCH BANDAGE: Brand: CURITY

## (undated) DEVICE — EXTREMITY DRAPE W/ARMBOARD COVERS: Brand: CONVERTORS

## (undated) DEVICE — SYRINGE 10ML LL CONTROL TOP

## (undated) DEVICE — BASIC DOUBLE BASIN 2-LF: Brand: MEDLINE INDUSTRIES, INC.

## (undated) DEVICE — PACK GENERAL LF

## (undated) DEVICE — BANDAGE, ESMARK LF STR 6"X9' (20/CS): Brand: CYPRESS

## (undated) DEVICE — SUT ETHILON 4-0 PS-2 18 IN 1667G

## (undated) DEVICE — STOCKINETTE REGULAR

## (undated) DEVICE — ACE WRAP 2 IN UNSTERILE

## (undated) DEVICE — ASTOUND STANDARD SURGICAL GOWN, XL: Brand: CONVERTORS

## (undated) DEVICE — HALF SHEET: Brand: CONVERTORS

## (undated) DEVICE — CHLORAPREP HI-LITE 26ML ORANGE

## (undated) DEVICE — OCCLUSIVE GAUZE STRIP,3% BISMUTH TRIBROMOPHENATE IN PETROLATUM BLEND: Brand: XEROFORM